# Patient Record
Sex: FEMALE | Race: WHITE | Employment: STUDENT | URBAN - METROPOLITAN AREA
[De-identification: names, ages, dates, MRNs, and addresses within clinical notes are randomized per-mention and may not be internally consistent; named-entity substitution may affect disease eponyms.]

---
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
## 2018-01-14 VITALS
HEIGHT: 65 IN | DIASTOLIC BLOOD PRESSURE: 60 MMHG | WEIGHT: 145 LBS | BODY MASS INDEX: 24.16 KG/M2 | SYSTOLIC BLOOD PRESSURE: 110 MMHG

## 2018-05-15 ENCOUNTER — OPTICAL OFFICE (OUTPATIENT)
Dept: URBAN - METROPOLITAN AREA CLINIC 146 | Facility: CLINIC | Age: 18
Setting detail: OPHTHALMOLOGY
End: 2018-05-15

## 2018-05-15 ENCOUNTER — DOCTOR'S OFFICE (OUTPATIENT)
Dept: URBAN - METROPOLITAN AREA CLINIC 137 | Facility: CLINIC | Age: 18
Setting detail: OPHTHALMOLOGY
End: 2018-05-15

## 2018-05-15 DIAGNOSIS — H52.13: ICD-10-CM

## 2018-05-15 DIAGNOSIS — H52.11: ICD-10-CM

## 2018-05-15 DIAGNOSIS — H52.12: ICD-10-CM

## 2018-05-15 PROCEDURE — SELFPAYVIS VISION VISIT SELF PAY: Performed by: OPHTHALMOLOGY

## 2018-05-15 PROCEDURE — S0500 DISPOS CONT LENS: HCPCS | Performed by: OPHTHALMOLOGY

## 2018-05-15 ASSESSMENT — CONFRONTATIONAL VISUAL FIELD TEST (CVF)
OD_FINDINGS: FULL
OS_FINDINGS: FULL

## 2018-05-15 ASSESSMENT — REFRACTION_CURRENTRX
OD_OVR_VA: 20/
OS_OVR_VA: 20/
OS_OVR_VA: 20/
OD_SPHERE: -2.00
OS_CYLINDER: +0.25
OD_OVR_VA: 20/
OD_CYLINDER: +0.25
OD_AXIS: 080
OD_OVR_VA: 20/
OS_AXIS: 096
OS_OVR_VA: 20/
OS_SPHERE: -2.00

## 2018-05-15 ASSESSMENT — REFRACTION_OUTSIDERX
OS_VA2: 20/20(J1+)
OD_SPHERE: -2.25
OD_SPHERE: -2.25
OS_AXIS: 090
OS_VA2: 20/
OS_AXIS: 90
OD_AXIS: 080
OD_VA1: 20/20
OD_CYLINDER: +0.25
OD_CYLINDER: +0.25
OU_VA: 20/
OS_VA1: 20/20
OD_VA2: 20/
OS_CYLINDER: +0.25
OS_SPHERE: -2.25
OS_SPHERE: -2.25
OD_VA3: 20/
OS_VA3: 20/
OD_AXIS: 80
OS_CYLINDER: +0.50
OD_VA3: 20/
OD_VA2: 20/20(J1+)
OU_VA: 20/
OS_VA3: 20/
OS_VA1: 20/20
OD_VA1: 20/20

## 2018-05-15 ASSESSMENT — VISUAL ACUITY
OD_BCVA: 20/20
OS_BCVA: 20/20

## 2018-05-15 ASSESSMENT — REFRACTION_AUTOREFRACTION
OS_CYLINDER: +0.75
OD_CYLINDER: +0.50
OS_AXIS: 091
OD_SPHERE: -2.25
OS_SPHERE: -2.25
OD_AXIS: 069

## 2018-05-15 ASSESSMENT — KERATOMETRY
OD_K2POWER_DIOPTERS: 46.75
OS_K1POWER_DIOPTERS: 45.75
OS_K2POWER_DIOPTERS: 46.50
OS_AXISANGLE_DEGREES: 092
OD_K1POWER_DIOPTERS: 46.00
OD_AXISANGLE_DEGREES: 082

## 2018-05-15 ASSESSMENT — AXIALLENGTH_DERIVED
OS_AL: 23.3605
OD_AL: 23.3183

## 2018-05-15 ASSESSMENT — REFRACTION_MANIFEST
OD_VA1: 20/
OS_VA1: 20/
OD_VA3: 20/
OU_VA: 20/
OS_VA3: 20/
OS_VA2: 20/
OD_VA2: 20/

## 2018-05-15 ASSESSMENT — SPHEQUIV_DERIVED
OD_SPHEQUIV: -2
OS_SPHEQUIV: -1.875

## 2018-09-24 ENCOUNTER — OPTICAL OFFICE (OUTPATIENT)
Dept: URBAN - METROPOLITAN AREA CLINIC 146 | Facility: CLINIC | Age: 18
Setting detail: OPHTHALMOLOGY
End: 2018-09-24

## 2018-09-24 DIAGNOSIS — H52.13: ICD-10-CM

## 2018-09-24 PROCEDURE — S0500 DISPOS CONT LENS: HCPCS | Performed by: OPHTHALMOLOGY

## 2019-03-01 ENCOUNTER — DOCTOR'S OFFICE (OUTPATIENT)
Dept: URBAN - METROPOLITAN AREA CLINIC 137 | Facility: CLINIC | Age: 19
Setting detail: OPHTHALMOLOGY
End: 2019-03-01

## 2019-03-01 DIAGNOSIS — H52.13: ICD-10-CM

## 2019-03-01 PROCEDURE — 92310 CONTACT LENS FITTING OU: CPT | Performed by: OPTOMETRIST

## 2019-03-01 PROCEDURE — SELFPAYVIS VISION VISIT SELF PAY: Performed by: OPTOMETRIST

## 2019-03-01 ASSESSMENT — REFRACTION_MANIFEST
OS_VA3: 20/
OD_VA1: 20/
OD_VA2: 20/
OD_VA3: 20/
OS_VA1: 20/
OS_VA2: 20/
OU_VA: 20/

## 2019-03-01 ASSESSMENT — CONFRONTATIONAL VISUAL FIELD TEST (CVF)
OD_FINDINGS: FULL
OS_FINDINGS: FULL

## 2019-12-26 ASSESSMENT — VISUAL ACUITY
OD_BCVA: 20/20-3
OS_BCVA: 20/20-2

## 2019-12-26 ASSESSMENT — REFRACTION_CURRENTRX
OS_OVR_VA: 20/
OD_OVR_VA: 20/
OS_OVR_VA: 20/
OS_AXIS: 096
OD_SPHERE: -2.00
OD_AXIS: 080
OD_CYLINDER: +0.25
OD_OVR_VA: 20/
OD_OVR_VA: 20/
OS_OVR_VA: 20/
OS_SPHERE: -2.00
OS_CYLINDER: +0.25

## 2019-12-26 ASSESSMENT — KERATOMETRY
OS_K1POWER_DIOPTERS: 45.75
OD_K1POWER_DIOPTERS: 46.00
OD_K2POWER_DIOPTERS: 46.75
OS_K2POWER_DIOPTERS: 46.50
OD_AXISANGLE_DEGREES: 082
OS_AXISANGLE_DEGREES: 092

## 2019-12-26 ASSESSMENT — REFRACTION_AUTOREFRACTION
OD_AXIS: 144
OS_SPHERE: -1.75
OD_SPHERE: -2.00
OS_CYLINDER: -1.00
OS_AXIS: 4
OD_CYLINDER: -0.25

## 2019-12-26 ASSESSMENT — REFRACTION_MANIFEST
OU_VA: 20/
OS_VA3: 20/
OS_CYLINDER: SPH
OD_VA2: 20/
OS_VA2: 20/
OS_SPHERE: -2.00
OD_VA3: 20/
OD_SPHERE: -2.00
OD_VA1: 20/20-
OS_VA1: 20/20
OD_CYLINDER: SPH

## 2019-12-26 ASSESSMENT — AXIALLENGTH_DERIVED
OD_AL: 23.366
OS_AL: 23.5047

## 2019-12-26 ASSESSMENT — SPHEQUIV_DERIVED
OS_SPHEQUIV: -2.25
OD_SPHEQUIV: -2.125

## 2020-01-07 ENCOUNTER — OFFICE VISIT (OUTPATIENT)
Dept: OBGYN CLINIC | Facility: CLINIC | Age: 20
End: 2020-01-07
Payer: COMMERCIAL

## 2020-01-07 VITALS
SYSTOLIC BLOOD PRESSURE: 118 MMHG | BODY MASS INDEX: 27.16 KG/M2 | WEIGHT: 169 LBS | DIASTOLIC BLOOD PRESSURE: 72 MMHG | HEIGHT: 66 IN

## 2020-01-07 DIAGNOSIS — B37.3 VAGINAL YEAST INFECTION: Primary | ICD-10-CM

## 2020-01-07 DIAGNOSIS — Z11.3 ROUTINE SCREENING FOR STI (SEXUALLY TRANSMITTED INFECTION): ICD-10-CM

## 2020-01-07 PROCEDURE — 87591 N.GONORRHOEAE DNA AMP PROB: CPT | Performed by: NURSE PRACTITIONER

## 2020-01-07 PROCEDURE — 87210 SMEAR WET MOUNT SALINE/INK: CPT | Performed by: NURSE PRACTITIONER

## 2020-01-07 PROCEDURE — 87491 CHLMYD TRACH DNA AMP PROBE: CPT | Performed by: NURSE PRACTITIONER

## 2020-01-07 PROCEDURE — 99213 OFFICE O/P EST LOW 20 MIN: CPT | Performed by: NURSE PRACTITIONER

## 2020-01-07 RX ORDER — FEXOFENADINE HYDROCHLORIDE 60 MG/1
60 TABLET, FILM COATED ORAL DAILY
COMMUNITY
End: 2021-07-13 | Stop reason: ALTCHOICE

## 2020-01-07 RX ORDER — FLUCONAZOLE 150 MG/1
150 TABLET ORAL ONCE
Qty: 1 TABLET | Refills: 0 | Status: SHIPPED | OUTPATIENT
Start: 2020-01-07 | End: 2020-01-07

## 2020-01-07 NOTE — PROGRESS NOTES
Assessment/Plan:      Diagnoses and all orders for this visit:    Vaginal yeast infection  -     fluconazole (DIFLUCAN) 150 mg tablet; Take 1 tablet (150 mg total) by mouth once for 1 dose    Routine screening for STI (sexually transmitted infection)  -     Chlamydia/GC amplified DNA by PCR    Other orders  -     fexofenadine (ALLEGRA) 60 MG tablet; Take 60 mg by mouth daily      -reviewed diagnosis of vaginal yeast infection and treatment with fluconazole  Written information provided  -reviewed safe sexual practices including consistent condom use  Patient verbalizes understanding of options for contraception  -urine dip negative in office today  -hygiene reviewed including avoid scented soaps lotions and lubricants, douching, tight/restrictive clothing  -will call with results    RTO for annual exam and if symptoms worsen or do not resolve    Subjective:     Patient ID: Jaimee Bui is a 22 y o  female  HPI G0 here with complaints vaginal itching, irritation and discharge for approximately 3 weeks  Was seen by PCP and treated with Flagyl  Patient states she has noticed no improvement or worsening of  symptoms since treatment  Associated symptoms include intermittent burning with urination and odor-different than normal   Has not tried any over-the-counter products  Denies alleviating or aggravating factors  Has not had similar episodes in the past   Is sexually active using condoms consistently  Denies new partner, bowel concerns, pelvic pain  Last Pap smear-has not had Pap smear  Last gonorrhea chlamydia-has not had gonorrhea/ chlamydia testing  Gardasil vaccine: Had vaccine series    Review of Systems   Constitutional: Negative for chills and fatigue  Genitourinary: Positive for vaginal discharge  Negative for decreased urine volume, difficulty urinating, dyspareunia, dysuria, flank pain, hematuria, menstrual problem, pelvic pain, urgency, vaginal bleeding and vaginal pain  Objective:     Physical Exam   Constitutional: She is oriented to person, place, and time  She appears well-developed and well-nourished  Cardiovascular: Normal rate, regular rhythm and normal heart sounds  Pulmonary/Chest: Effort normal and breath sounds normal    Genitourinary: There is no rash, tenderness, lesion or injury on the right labia  There is no rash, tenderness, lesion or injury on the left labia  Right adnexum displays no mass, no tenderness and no fullness  Left adnexum displays no mass, no tenderness and no fullness  No erythema, tenderness or bleeding in the vagina  No foreign body in the vagina  No signs of injury around the vagina  Vaginal discharge found  Genitourinary Comments: Large amount of vaginal discharge noted on exam-white and adherent to vaginal walls   Neurological: She is alert and oriented to person, place, and time  Psychiatric: She has a normal mood and affect   Her behavior is normal  Thought content normal

## 2020-01-07 NOTE — PATIENT INSTRUCTIONS
Fluconazole (By mouth)   Fluconazole (khju-[id number]-[id number])  Prevents and treats fungal infections  Brand Name(s): Diflucan   There may be other brand names for this medicine  When This Medicine Should Not Be Used: This medicine is not right for everyone  Do not use it if you had an allergic reaction to fluconazole, or if you are pregnant  How to Use This Medicine:   Liquid, Tablet  · Your doctor will tell you how much medicine to use  Do not use more than directed  · Oral liquid: Shake well just before each use  Measure the oral liquid medicine with a marked measuring spoon, oral syringe, or medicine cup  · Take all of the medicine in your prescription to clear up your infection, even if you feel better after the first few doses  · Read and follow the patient instructions that come with this medicine  Talk to your doctor or pharmacist if you have any questions  · Missed dose: Take a dose as soon as you remember  If it is almost time for your next dose, wait until then and take a regular dose  Do not take extra medicine to make up for a missed dose  · Store the medicine in a closed container at room temperature, away from heat, moisture, and direct light  Store the oral liquid in the refrigerator or at room temperature and use it within 14 days  Do not freeze  Drugs and Foods to Avoid:   Ask your doctor or pharmacist before using any other medicine, including over-the-counter medicines, vitamins, and herbal products  · Do not use this medicine together with astemizole, cisapride, erythromycin, pimozide, quinidine, or terfenadine  · Some foods and medicines can affect how fluconazole works  Tell your doctor if you are using cimetidine, midazolam, prednisone, rifabutin, rifampin, theophylline, tofacitinib, triazolam, vitamin A supplements, or voriconazole   Also tell your doctor if you are using any of the following:   ¨ A blood thinner (such as warfarin)  ¨ A diuretic or "water pill" (such as hydrochlorothiazide), or blood pressure medicine (such as amlodipine, felodipine, isradipine, losartan, nifedipine)  ¨ Birth control pills  ¨ Cancer medicine (cyclophosphamide, vinblastine, vincristine)  ¨ Diabetes medicine that you take by mouth (glipizide, glyburide, tolbutamide)  ¨ Medicine to lower cholesterol (atorvastatin, fluvastatin, simvastatin)  ¨ Medicine to treat depression (amitriptyline, nortriptyline)  ¨ Medicine to treat HIV/AIDS (saquinavir, zidovudine)  ¨ Medicine to treat malaria (halofantrine)  ¨ Medicine to treat seizures (carbamazepine, phenytoin)  ¨ Medicine that weakens the immune system (cyclosporine, sirolimus, tacrolimus)  ¨ Narcotic pain medicine (alfentanil, fentanyl, methadone)  ¨ Pain or arthritis medicine (aspirin, celecoxib, diclofenac, ibuprofen, naproxen)  Warnings While Using This Medicine:   · It is not safe to take this medicine during pregnancy  It could harm an unborn baby  Tell your doctor right away if you become pregnant  · Tell your doctor if you are breastfeeding, or if you have kidney disease, liver disease, heart disease, heart rhythm problems, cancer, or HIV/AIDS  · This medicine may cause the following problems:   ¨ Liver problems  ¨ Serious skin reactions  ¨ Changes in heart rhythm, such as a condition called QT prolongation  · This medicine may make you dizzy or drowsy  Do not drive or do anything that could be dangerous until you know how this medicine affects you  · Call your doctor if your symptoms do not improve or if they get worse  · Keep all medicine out of the reach of children  Never share your medicine with anyone    Possible Side Effects While Using This Medicine:   Call your doctor right away if you notice any of these side effects:  · Allergic reaction: Itching or hives, swelling in your face or hands, swelling or tingling in your mouth or throat, chest tightness, trouble breathing  · Blistering, peeling, or red skin rash  · Dark urine or pale stools, nausea, vomiting, loss of appetite, stomach pain, yellow skin or eyes  · Fast, pounding, or uneven heartbeat  · Unusual bleeding, bruising, or weakness  If you notice these less serious side effects, talk with your doctor:   · Headache  · Mild nausea, vomiting, stomach pain, or diarrhea  If you notice other side effects that you think are caused by this medicine, tell your doctor  Call your doctor for medical advice about side effects  You may report side effects to FDA at 8-038-KLM-9612  © 2017 [employer]  Information is for End User's use only and may not be sold, redistributed or otherwise used for commercial purposes  The above information is an  only  It is not intended as medical advice for individual conditions or treatments  Talk to your doctor, nurse or pharmacist before following any medical regimen to see if it is safe and effective for you  Vulvovaginal Candidiasis   WHAT YOU NEED TO KNOW:   Vulvovaginal candidiasis, or yeast infection, is a common vaginal infection  Vulvovaginal candidiasis is caused by a fungus, or yeast-like germ  Fungi are normally found in your vagina  When there are too many fungi, it can cause an infection  DISCHARGE INSTRUCTIONS:   Return to the emergency department if:   · You have fever and chills  · You are bleeding from your vagina and it is not your monthly period  · You develop abdominal or pelvic pain  Contact your healthcare provider if:   · Your signs and symptoms get worse, even after treatment  · You have questions or concerns about your condition or care  Medicines:   · Medicines  help treat the fungal infection and decrease inflammation  The medicine may be a pill, topical cream, or vaginal suppository  · Take your medicine as directed  Contact your healthcare provider if you think your medicine is not helping or if you have side effects  Tell him of her if you are allergic to any medicine   Keep a list of the medicines, vitamins, and herbs you take  Include the amounts, and when and why you take them  Bring the list or the pill bottles to follow-up visits  Carry your medicine list with you in case of an emergency  Manage your symptoms:   · Wear cotton underwear  · Keep the vaginal area clean and dry  · Do not have sex until your symptoms are gone  · Do not douche  · Do not use feminine hygiene sprays, powders, or bubble bath  Prevent another infection:   · Take showers instead of baths  · Eat yogurt  · Limit the amount of alcohol you drink'    · Control your blood sugar if you are diabetic  · Limit your time in hot tubs  Follow up with your healthcare provider as directed:  Write down your questions so you remember to ask them during your visits  © 2017 [employer] Information is for End User's use only and may not be sold, redistributed or otherwise used for commercial purposes  All illustrations and images included in CareNotes® are the copyrighted property of [employer]  or [employer]  The above information is an  only  It is not intended as medical advice for individual conditions or treatments  Talk to your doctor, nurse or pharmacist before following any medical regimen to see if it is safe and effective for you

## 2020-01-08 LAB
C TRACH DNA SPEC QL NAA+PROBE: NEGATIVE
N GONORRHOEA DNA SPEC QL NAA+PROBE: NEGATIVE

## 2021-01-07 ENCOUNTER — IMMUNIZATIONS (OUTPATIENT)
Dept: FAMILY MEDICINE CLINIC | Facility: HOSPITAL | Age: 21
End: 2021-01-07

## 2021-01-07 DIAGNOSIS — Z23 ENCOUNTER FOR IMMUNIZATION: ICD-10-CM

## 2021-01-07 PROCEDURE — 0011A SARS-COV-2 / COVID-19 MRNA VACCINE (MODERNA) 100 MCG: CPT | Performed by: EMERGENCY MEDICINE

## 2021-01-07 PROCEDURE — 91301 SARS-COV-2 / COVID-19 MRNA VACCINE (MODERNA) 100 MCG: CPT | Performed by: EMERGENCY MEDICINE

## 2021-02-04 ENCOUNTER — IMMUNIZATIONS (OUTPATIENT)
Dept: FAMILY MEDICINE CLINIC | Facility: HOSPITAL | Age: 21
End: 2021-02-04

## 2021-02-04 DIAGNOSIS — Z23 ENCOUNTER FOR IMMUNIZATION: Primary | ICD-10-CM

## 2021-02-04 PROCEDURE — 91301 SARS-COV-2 / COVID-19 MRNA VACCINE (MODERNA) 100 MCG: CPT | Performed by: ANESTHESIOLOGY

## 2021-02-04 PROCEDURE — 0012A SARS-COV-2 / COVID-19 MRNA VACCINE (MODERNA) 100 MCG: CPT | Performed by: ANESTHESIOLOGY

## 2021-07-13 ENCOUNTER — ANNUAL EXAM (OUTPATIENT)
Dept: OBGYN CLINIC | Facility: CLINIC | Age: 21
End: 2021-07-13
Payer: COMMERCIAL

## 2021-07-13 VITALS
WEIGHT: 156.2 LBS | SYSTOLIC BLOOD PRESSURE: 116 MMHG | BODY MASS INDEX: 25.1 KG/M2 | DIASTOLIC BLOOD PRESSURE: 70 MMHG | HEIGHT: 66 IN

## 2021-07-13 DIAGNOSIS — Z01.419 ENCOUNTER FOR GYNECOLOGICAL EXAMINATION (GENERAL) (ROUTINE) WITHOUT ABNORMAL FINDINGS: Primary | ICD-10-CM

## 2021-07-13 DIAGNOSIS — Z11.3 SCREENING FOR STD (SEXUALLY TRANSMITTED DISEASE): ICD-10-CM

## 2021-07-13 DIAGNOSIS — Z30.09 COUNSELING FOR BIRTH CONTROL REGARDING INTRAUTERINE DEVICE (IUD): ICD-10-CM

## 2021-07-13 PROCEDURE — G0145 SCR C/V CYTO,THINLAYER,RESCR: HCPCS | Performed by: PHYSICIAN ASSISTANT

## 2021-07-13 PROCEDURE — 87491 CHLMYD TRACH DNA AMP PROBE: CPT | Performed by: PHYSICIAN ASSISTANT

## 2021-07-13 PROCEDURE — 87591 N.GONORRHOEAE DNA AMP PROB: CPT | Performed by: PHYSICIAN ASSISTANT

## 2021-07-13 PROCEDURE — 99395 PREV VISIT EST AGE 18-39: CPT | Performed by: PHYSICIAN ASSISTANT

## 2021-07-13 RX ORDER — ESCITALOPRAM OXALATE 10 MG/1
TABLET ORAL
COMMUNITY
Start: 2021-04-09 | End: 2021-07-13 | Stop reason: ALTCHOICE

## 2021-07-13 RX ORDER — FEXOFENADINE HCL 180 MG/1
180 TABLET ORAL
COMMUNITY

## 2021-07-13 NOTE — PROGRESS NOTES
Assessment/Plan   Diagnoses and all orders for this visit:    Encounter for gynecological examination (general) (routine) without abnormal findings  -     Liquid-based pap, screening  The current ASCCP guidelines were reviewed  Patient's last pap was never and therefore, a pap is indicated at this time  I emphasized the importance of an annual pelvic and breast exam  Patient ok to have a pap done today  Screening for STD (sexually transmitted disease)  -     Hepatitis B surface antigen; Future  -     Hepatitis C antibody; Future  -     HIV 1/2 Antigen/Antibody (4th Generation) w Reflex [facility]; Future  -     RPR; Future  -     Chlamydia/GC amplified DNA by PCR  Encourage safe sexual practices; STD testing - C/G cultures collected and STI labs ordered    Counseling for birth control regarding intrauterine device (IUD)  Various contraceptive options were reviewed including, but not limited to, barrier methods (condoms, diaphragm), both combination (pill, patch, vaginal ring) and progesterone- only (pill, Depo provera, and Nexplanon), intrauterine devices (christiano, Kyleena, Mirena and Paragard)  The mechanisms, risks, benefits, and side effects of all methods were discussed  All questions have been answered to her satisfaction  Patient most interested in an IUD - reviewed Christiano vs Kyleena vs Mirena vs Paragard in great detail  The following risks were reviewed: if infected with a sexually transmitted infection such as chlamydia or gonorrhea the risk for pelvic inflammatory disease may be greater, as well as for resulting chronic pelvic pain or infertility  Emphasized need to continue with safe sex practices with condom use  Also reviewed possible dysfunction bleeding for a few months after insertion, the possibility of expulsion, the risk of ectopic pregnancy if patient were to conceive with the IUD in place  Patient expressed understanding of above and form regarding coverage, ordering, and scheduling provided  Directed patient to product information as well  Patient to call with any further questions/concerns  Discussion  I have discussed the importance of monthly self-breast exams, exercise and healthy diet as well as adequate intake of calcium and vitamin D  Encourage MVI q day and r/britney importance of folic acid; Encourage 30-40 min weight bearing exercise most days of week  Contraception - condoms and desires to discuss options  The patient has had the Gardasil vaccine series, which is recommended for patients from 545 years of age  All questions have been answered to her satisfaction  RTO for APE or sooner if needed    Subjective     HPI   Hero Kincaid is a 23 y o  female who presents for annual well woman exam    Menarche - 9; LMP - 6/5/21; Periods are reg q every 5 weeks - does get delayed with stress; periods last 6 days; No excessive bleeding; No intermenstrual bleeding or spotting; Cramps are tolerable  Increase hair growth - currently undergoing laser hair removal  Denies excessive acne  No vulvar itch/burn; No vaginal itch/burn; No abn discharge or odor; No urinary sx - burning/pain/frequency/hematuria  (+) SBEs - no breast masses, asymmetry, nipple discharge or bleeding, changes in skin of breast, or breast tenderness bilaterally  No abd/pelvic pain or HAs;   Pt is sexually active in a mutually monog sexual relationship x 5 months; No issues with intercourse; She requests std/hiv/hep testing; Feels safe at home  Current contraception: condoms  Gardasil - per patient received all 3 shots  (+) PCP for routine Bw/care; Last Pap - none  History of abnormal Pap smear:   Last STD screen - 1/7/20 - C/G neg    Review of Systems   Constitutional: Negative for activity change, fatigue, fever and unexpected weight change  HENT: Negative for congestion, dental problem, sinus pressure and sinus pain  Eyes: Negative for visual disturbance     Respiratory: Negative for cough, shortness of breath and wheezing  Cardiovascular: Negative for chest pain and leg swelling  Gastrointestinal: Negative for abdominal distention, abdominal pain, blood in stool, constipation, diarrhea, nausea and vomiting  Endocrine: Negative for polydipsia  Genitourinary: Negative for difficulty urinating, dyspareunia, dysuria, frequency, hematuria, menstrual problem, pelvic pain, urgency, vaginal bleeding, vaginal discharge and vaginal pain  Musculoskeletal: Negative for arthralgias and back pain  Allergic/Immunologic: Negative for environmental allergies  Neurological: Negative for dizziness, seizures and headaches  Psychiatric/Behavioral: Negative for dysphoric mood and sleep disturbance  The patient is not nervous/anxious          The following portions of the patient's history were reviewed and updated as appropriate: allergies, current medications, past family history, past medical history, past social history, past surgical history and problem list          OB History        0    Para   0    Term   0       0    AB   0    Living   0       SAB   0    TAB   0    Ectopic   0    Multiple   0    Live Births   0                 Past Medical History:   Diagnosis Date    Seasonal allergies        Past Surgical History:   Procedure Laterality Date    WISDOM TOOTH EXTRACTION  2016       Family History   Problem Relation Age of Onset    No Known Problems Mother     No Known Problems Father        Social History     Socioeconomic History    Marital status: Single     Spouse name: Not on file    Number of children: Not on file    Years of education: Not on file    Highest education level: Not on file   Occupational History    Not on file   Tobacco Use    Smoking status: Never Smoker    Smokeless tobacco: Never Used   Vaping Use    Vaping Use: Never used   Substance and Sexual Activity    Alcohol use: Yes     Comment: social     Drug use: Never    Sexual activity: Yes     Partners: Male     Birth control/protection: Condom Male   Other Topics Concern    Not on file   Social History Narrative    Not on file     Social Determinants of Health     Financial Resource Strain:     Difficulty of Paying Living Expenses:    Food Insecurity:     Worried About Running Out of Food in the Last Year:     [address] in the Last Year:    Transportation Needs:     Lack of Transportation (Medical):  Lack of Transportation (Non-Medical):    Physical Activity:     Days of Exercise per Week:     Minutes of Exercise per Session:    Stress:     Feeling of Stress :    Social Connections:     Frequency of Communication with Friends and Family:     Frequency of Social Gatherings with Friends and Family:     Attends [employer] Services:     Active Member of Clubs or Organizations:     Attends Club or Organization Meetings:     Marital Status:    Intimate Partner Violence:     Fear of Current or Ex-Partner:     Emotionally Abused:     Physically Abused:     Sexually Abused:          Current Outpatient Medications:     fexofenadine (ALLEGRA) 180 MG tablet, Take 180 mg by mouth, Disp: , Rfl:     MULTIPLE VITAMIN PO, Take by mouth, Disp: , Rfl:     Allergies   Allergen Reactions    Delsym [Dextromethorphan] Hives       Objective   Vitals:    07/13/21 1136   BP: 116/70   BP Location: Left arm   Patient Position: Sitting   Cuff Size: Standard   Weight: 70 9 kg (156 lb 3 2 oz)   Height: 5' 6" (1 676 m)     Physical Exam  Vitals reviewed  Constitutional:       General: She is awake  She is not in acute distress  Appearance: Normal appearance  She is well-developed and well-groomed  She is not diaphoretic  Eyes:      Conjunctiva/sclera: Conjunctivae normal    Neck:      Thyroid: No thyroid mass, thyromegaly or thyroid tenderness  Cardiovascular:      Rate and Rhythm: Normal rate and regular rhythm  Heart sounds: Normal heart sounds  No murmur heard       Pulmonary:      Effort: Pulmonary effort is normal  No tachypnea, bradypnea or respiratory distress  Breath sounds: Normal breath sounds  No stridor or decreased air movement  No wheezing  Chest:      Breasts: Breasts are symmetrical          Right: Normal  No swelling, bleeding, inverted nipple, mass, nipple discharge, skin change or tenderness  Left: Normal  No swelling, bleeding, inverted nipple, mass, nipple discharge, skin change or tenderness  Abdominal:      General: There is no distension  Palpations: Abdomen is soft  There is no hepatomegaly, splenomegaly or mass  Tenderness: There is no abdominal tenderness  Hernia: No hernia is present  There is no hernia in the left inguinal area or right inguinal area  Genitourinary:     General: Normal vulva  Exam position: Supine  Pubic Area: No rash or pubic lice  Labia:         Right: No rash, tenderness, lesion or injury  Left: No rash, tenderness, lesion or injury  Urethra: No prolapse, urethral pain, urethral swelling or urethral lesion  Vagina: Normal  No signs of injury and foreign body  No vaginal discharge, erythema, tenderness, bleeding, lesions or prolapsed vaginal walls  Cervix: No cervical motion tenderness, discharge, friability, lesion, erythema or cervical bleeding  Uterus: Not deviated, not enlarged, not fixed, not tender and no uterine prolapse  Adnexa:         Right: No mass, tenderness or fullness  Left: No mass, tenderness or fullness  Lymphadenopathy:      Cervical: No cervical adenopathy  Upper Body:      Right upper body: No supraclavicular or axillary adenopathy  Left upper body: No supraclavicular or axillary adenopathy  Lower Body: No right inguinal adenopathy  No left inguinal adenopathy  Skin:     General: Skin is warm and dry  Neurological:      Mental Status: She is alert and oriented to person, place, and time     Psychiatric:         Mood and Affect: Mood and affect normal          Speech: Speech normal          Behavior: Behavior normal  Behavior is cooperative  Thought Content:  Thought content normal          Judgment: Judgment normal

## 2021-07-14 LAB
C TRACH DNA SPEC QL NAA+PROBE: NEGATIVE
N GONORRHOEA DNA SPEC QL NAA+PROBE: NEGATIVE

## 2021-07-19 LAB
LAB AP GYN PRIMARY INTERPRETATION: NORMAL
LAB AP LMP: NORMAL
Lab: NORMAL

## 2021-08-16 ENCOUNTER — TELEPHONE (OUTPATIENT)
Dept: OBGYN CLINIC | Facility: CLINIC | Age: 21
End: 2021-08-16

## 2021-08-16 NOTE — TELEPHONE ENCOUNTER
Pt called and would like talk about birth control options and hormone testing for concerns with PCOS,  She had appt with Mary Lamb in July

## 2021-08-26 ENCOUNTER — OFFICE VISIT (OUTPATIENT)
Dept: OBGYN CLINIC | Facility: CLINIC | Age: 21
End: 2021-08-26
Payer: COMMERCIAL

## 2021-08-26 VITALS
HEIGHT: 66 IN | BODY MASS INDEX: 24.43 KG/M2 | DIASTOLIC BLOOD PRESSURE: 74 MMHG | SYSTOLIC BLOOD PRESSURE: 112 MMHG | WEIGHT: 152 LBS

## 2021-08-26 DIAGNOSIS — Z30.09 COUNSELING FOR INITIATION OF BIRTH CONTROL METHOD: ICD-10-CM

## 2021-08-26 DIAGNOSIS — N92.1 MENORRHAGIA WITH IRREGULAR CYCLE: Primary | ICD-10-CM

## 2021-08-26 PROCEDURE — 3008F BODY MASS INDEX DOCD: CPT | Performed by: PHYSICIAN ASSISTANT

## 2021-08-26 PROCEDURE — 99213 OFFICE O/P EST LOW 20 MIN: CPT | Performed by: PHYSICIAN ASSISTANT

## 2021-08-26 RX ORDER — SODIUM FLUORIDE 6 MG/ML
PASTE, DENTIFRICE DENTAL
COMMUNITY
Start: 2021-08-05

## 2021-08-26 RX ORDER — DROSPIRENONE AND ETHINYL ESTRADIOL 0.02-3(28)
1 KIT ORAL DAILY
Qty: 28 TABLET | Refills: 3 | Status: SHIPPED | OUTPATIENT
Start: 2021-08-26 | End: 2021-12-14 | Stop reason: SDUPTHER

## 2021-08-26 NOTE — ASSESSMENT & PLAN NOTE
Fasting labs and pelvic ultrasound given for work-up  Will plan trial of Danitza to help address her symptoms    RTO in 3 months for a pill check

## 2021-08-26 NOTE — PROGRESS NOTES
Assessment/Plan   Diagnoses and all orders for this visit:    Menorrhagia with irregular cycle  -     CBC and differential; Future  -     Comprehensive metabolic panel; Future  -     Follicle stimulating hormone; Future  -     Hemoglobin A1C; Future  -     Insulin, fasting; Future  -     Luteinizing hormone; Future  -     Pregnancy Test (HCG Qualitative); Future  -     Prolactin; Future  -     T4, free; Future  -     TSH, 3rd generation; Future  -     Testosterone, free, total; Future  -     US pelvis complete w transvaginal; Future    Counseling for initiation of birth control method    Discussion  Based on signs and symptoms, PCOS could be an explanation  Work-up provided including fasting BW and patient advised to have done prior to starting on OCP  Slip for a pelvic ultrasound given as well to assess heavy periods and pain during intercourse - best to get done the week after her period  Desires OCP - will try Danitza to hopefully best address all her symptoms:   1)  Begin the pill on the Sunday of the week of your next period, starting with the first pill in the packet (If your period starts on a Sunday - start the pill that very same day; if your period starts any other day of the week - wait until the Sunday of that week to start with the first pill)  Take it the same time daily, within the same hour time frame (such as between 8 and 9am)  2) It common to experience some irregular bleeding when newly starting the pill  This should resolve after 3 months of use  Also minor side effects such as breast tenderness, minor headaches and nausea may occur, but typically resolve after continuing on the pill for at least 3 months  3)  Call if you experience severe headaches, visual disturbances, chest pain, shortness of breath, abdominal pain, pain tingling or weakness in arms or legs  4) Advise a back-up method, like condoms, during the first month on the OCP, if misses any pills, or is put on antibiotics  Reviewed missed pill protocol;   5) Advise safe sexual practices and the importance of condoms to prevent the transmission of STDs  6) Return visit in 3 months for pill & blood pressure check  Can consider re-trial of laser hair removal after at least 6 months on Danitza    Subjective     HPI   Katina Eisenmenger is a 23 y o  female who presents to discuss possible PCOS  Menarche - 4; LMP - 7/30/21; Periods are irreg - come for the most part every 5 weeks - can get delayed due to stress so can go as long as 7 weeks; last 6 days; Heavy flow days 2-4 - at the heaviest changes a super tampon q 2 hours; No intermenstrual bleeding or spotting; Cramps can be intense but are usually tolerable with Aleve  At her annual on 7/13/21 - did report increased hair growth - got laser hair removal and denied excessive acne - feels like the laser hair removal did not work so was told could be hormonal  Also feels like her emotions fluctuate a lot prior to and during period  We discussed IUD at that time - counseled on all different types and info given - she is no longer interested and desires to start on combination OCP in hopes will also help to decrease her hair growth  No abd/pelvic pain or HAs; Reports painful IC with deep penetration for the past couple months - not every time  Pt is sexually active in a mutually monog sexual relationship - same partner as last visit; She declines std/hiv/hep testing; Feels safe at home  Current contraception: condoms    Last Pap - 7/13/21 - WNL  History of abnormal Pap smear: no  Last STI screen - 7/13/21- C/G neg and STI labs not done    Review of Systems   Constitutional: Negative for activity change, fatigue, fever and unexpected weight change  HENT: Negative for congestion, dental problem, sinus pressure and sinus pain  Eyes: Negative for visual disturbance  Respiratory: Negative for cough, shortness of breath and wheezing  Cardiovascular: Negative for chest pain and leg swelling  Gastrointestinal: Negative for abdominal distention, abdominal pain, blood in stool, constipation, diarrhea, nausea and vomiting  Endocrine: Negative for polydipsia  Genitourinary: Positive for dyspareunia (as noted in HPI) and menstrual problem (as noted in HPI)  Negative for difficulty urinating, dysuria, frequency, hematuria, pelvic pain, urgency, vaginal bleeding, vaginal discharge and vaginal pain  Musculoskeletal: Negative for arthralgias and back pain  Allergic/Immunologic: Negative for environmental allergies  Neurological: Negative for dizziness, seizures and headaches  Psychiatric/Behavioral: Negative for dysphoric mood and sleep disturbance  The patient is not nervous/anxious  The following portions of the patient's history were reviewed and updated as appropriate: allergies, current medications, past family history, past medical history, past social history, past surgical history and problem list          Past Medical History:   Diagnosis Date    Seasonal allergies        Past Surgical History:   Procedure Laterality Date    WISDOM TOOTH EXTRACTION  11/2016       Family History   Problem Relation Age of Onset    No Known Problems Mother     No Known Problems Father        Social History     Socioeconomic History    Marital status: Single     Spouse name: Not on file    Number of children: Not on file    Years of education: Not on file    Highest education level: Not on file   Occupational History    Not on file   Tobacco Use    Smoking status: Never Smoker    Smokeless tobacco: Never Used   Vaping Use    Vaping Use: Never used   Substance and Sexual Activity    Alcohol use:  Yes     Alcohol/week: 6 0 standard drinks     Types: 6 Standard drinks or equivalent per week     Comment: social     Drug use: Never    Sexual activity: Yes     Partners: Male     Birth control/protection: Condom Male   Other Topics Concern    Not on file   Social History Narrative    Not on file Social Determinants of Health     Financial Resource Strain:     Difficulty of Paying Living Expenses:    Food Insecurity:     Worried About Running Out of Food in the Last Year:     [address] in the Last Year:    Transportation Needs:     Lack of Transportation (Medical):  Lack of Transportation (Non-Medical):    Physical Activity:     Days of Exercise per Week:     Minutes of Exercise per Session:    Stress:     Feeling of Stress :    Social Connections:     Frequency of Communication with Friends and Family:     Frequency of Social Gatherings with Friends and Family:     Attends Yazidism Services:     Active Member of Clubs or Organizations:     Attends Club or Organization Meetings:     Marital Status:    Intimate Partner Violence:     Fear of Current or Ex-Partner:     Emotionally Abused:     Physically Abused:     Sexually Abused:          Current Outpatient Medications:     fexofenadine (ALLEGRA) 180 MG tablet, Take 180 mg by mouth, Disp: , Rfl:     MULTIPLE VITAMIN PO, Take by mouth, Disp: , Rfl:     PreviDent 5000 Booster Plus 1 1 % PSTE, , Disp: , Rfl:     Allergies   Allergen Reactions    Delsym [Dextromethorphan] Hives       Objective   Vitals:    08/26/21 1120   BP: 112/74   BP Location: Left arm   Patient Position: Sitting   Cuff Size: Standard   Weight: 68 9 kg (152 lb)   Height: 5' 6" (1 676 m)     Physical Exam  Vitals reviewed  Constitutional:       General: She is not in acute distress  Appearance: Normal appearance  She is normal weight  She is not ill-appearing, toxic-appearing or diaphoretic  Neurological:      Mental Status: She is alert and oriented to person, place, and time  Psychiatric:         Mood and Affect: Mood normal          Behavior: Behavior normal          Thought Content:  Thought content normal          Judgment: Judgment normal

## 2021-08-31 LAB
ALBUMIN SERPL-MCNC: 4.4 G/DL (ref 3.6–5.1)
ALBUMIN/GLOB SERPL: 1.8 (CALC) (ref 1–2.5)
ALP SERPL-CCNC: 70 U/L (ref 31–125)
ALT SERPL-CCNC: 12 U/L (ref 6–29)
AST SERPL-CCNC: 15 U/L (ref 10–30)
B-HCG SERPL QL: NEGATIVE
BASOPHILS # BLD AUTO: 28 CELLS/UL (ref 0–200)
BASOPHILS NFR BLD AUTO: 0.4 %
BILIRUB SERPL-MCNC: 0.5 MG/DL (ref 0.2–1.2)
BUN SERPL-MCNC: 16 MG/DL (ref 7–25)
BUN/CREAT SERPL: NORMAL (CALC) (ref 6–22)
CALCIUM SERPL-MCNC: 9.4 MG/DL (ref 8.6–10.2)
CHLORIDE SERPL-SCNC: 105 MMOL/L (ref 98–110)
CO2 SERPL-SCNC: 26 MMOL/L (ref 20–32)
CREAT SERPL-MCNC: 0.67 MG/DL (ref 0.5–1.1)
EOSINOPHIL # BLD AUTO: 168 CELLS/UL (ref 15–500)
EOSINOPHIL NFR BLD AUTO: 2.4 %
ERYTHROCYTE [DISTWIDTH] IN BLOOD BY AUTOMATED COUNT: 12.3 % (ref 11–15)
FSH SERPL-ACNC: 3.8 MIU/ML
GLOBULIN SER CALC-MCNC: 2.4 G/DL (CALC) (ref 1.9–3.7)
GLUCOSE SERPL-MCNC: 82 MG/DL (ref 65–99)
HBA1C MFR BLD: 4.8 % OF TOTAL HGB
HCT VFR BLD AUTO: 37.6 % (ref 35–45)
HGB BLD-MCNC: 12.6 G/DL (ref 11.7–15.5)
INSULIN SERPL-ACNC: 4.9 UIU/ML
LH SERPL-ACNC: 10 MIU/ML
LYMPHOCYTES # BLD AUTO: 2051 CELLS/UL (ref 850–3900)
LYMPHOCYTES NFR BLD AUTO: 29.3 %
MCH RBC QN AUTO: 30 PG (ref 27–33)
MCHC RBC AUTO-ENTMCNC: 33.5 G/DL (ref 32–36)
MCV RBC AUTO: 89.5 FL (ref 80–100)
MONOCYTES # BLD AUTO: 588 CELLS/UL (ref 200–950)
MONOCYTES NFR BLD AUTO: 8.4 %
NEUTROPHILS # BLD AUTO: 4165 CELLS/UL (ref 1500–7800)
NEUTROPHILS NFR BLD AUTO: 59.5 %
PLATELET # BLD AUTO: 231 THOUSAND/UL (ref 140–400)
PMV BLD REES-ECKER: 10.9 FL (ref 7.5–12.5)
POTASSIUM SERPL-SCNC: 4.2 MMOL/L (ref 3.5–5.3)
PROLACTIN SERPL-MCNC: 23.1 NG/ML
PROT SERPL-MCNC: 6.8 G/DL (ref 6.1–8.1)
RBC # BLD AUTO: 4.2 MILLION/UL (ref 3.8–5.1)
SL AMB EGFR AFRICAN AMERICAN: 145 ML/MIN/1.73M2
SL AMB EGFR NON AFRICAN AMERICAN: 125 ML/MIN/1.73M2
SODIUM SERPL-SCNC: 138 MMOL/L (ref 135–146)
T4 FREE SERPL-MCNC: 1 NG/DL (ref 0.8–1.8)
TESTOST FREE SERPL-MCNC: 6.1 PG/ML (ref 0.1–6.4)
TESTOST SERPL-MCNC: 34 NG/DL (ref 2–45)
TSH SERPL-ACNC: 1.62 MIU/L
WBC # BLD AUTO: 7 THOUSAND/UL (ref 3.8–10.8)

## 2021-09-03 ENCOUNTER — TELEPHONE (OUTPATIENT)
Dept: OBGYN CLINIC | Facility: CLINIC | Age: 21
End: 2021-09-03

## 2021-11-01 ENCOUNTER — TELEPHONE (OUTPATIENT)
Dept: OBGYN CLINIC | Facility: CLINIC | Age: 21
End: 2021-11-01

## 2021-12-14 DIAGNOSIS — N92.1 MENORRHAGIA WITH IRREGULAR CYCLE: ICD-10-CM

## 2021-12-14 DIAGNOSIS — Z30.09 COUNSELING FOR INITIATION OF BIRTH CONTROL METHOD: ICD-10-CM

## 2021-12-14 RX ORDER — DROSPIRENONE AND ETHINYL ESTRADIOL 0.02-3(28)
1 KIT ORAL DAILY
Qty: 84 TABLET | Refills: 2 | Status: SHIPPED | OUTPATIENT
Start: 2021-12-14

## 2022-09-01 DIAGNOSIS — Z30.09 COUNSELING FOR INITIATION OF BIRTH CONTROL METHOD: ICD-10-CM

## 2022-09-01 DIAGNOSIS — N92.1 MENORRHAGIA WITH IRREGULAR CYCLE: ICD-10-CM

## 2022-09-01 RX ORDER — DROSPIRENONE AND ETHINYL ESTRADIOL 0.02-3(28)
1 KIT ORAL DAILY
Qty: 84 TABLET | Refills: 0 | Status: SHIPPED | OUTPATIENT
Start: 2022-09-01

## 2022-09-01 NOTE — TELEPHONE ENCOUNTER
Pt missed her yearly appt yesterday( thought it was today) she only has one month of BC left and I couldn't get her in for her yearly until the end of November  Could we please give her enough to hold her over? Thank you!

## 2022-11-28 ENCOUNTER — ANNUAL EXAM (OUTPATIENT)
Dept: OBGYN CLINIC | Facility: CLINIC | Age: 22
End: 2022-11-28

## 2022-11-28 VITALS
SYSTOLIC BLOOD PRESSURE: 110 MMHG | DIASTOLIC BLOOD PRESSURE: 74 MMHG | WEIGHT: 158 LBS | BODY MASS INDEX: 25.39 KG/M2 | HEIGHT: 66 IN

## 2022-11-28 DIAGNOSIS — Z01.419 ENCOUNTER FOR WELL WOMAN EXAM WITH ROUTINE GYNECOLOGICAL EXAM: Primary | ICD-10-CM

## 2022-11-28 DIAGNOSIS — Z30.09 COUNSELING FOR INITIATION OF BIRTH CONTROL METHOD: ICD-10-CM

## 2022-11-28 DIAGNOSIS — N92.1 MENORRHAGIA WITH IRREGULAR CYCLE: ICD-10-CM

## 2022-11-28 DIAGNOSIS — B37.31 VAGINAL CANDIDIASIS: ICD-10-CM

## 2022-11-28 DIAGNOSIS — Z11.3 SCREENING EXAMINATION FOR STD (SEXUALLY TRANSMITTED DISEASE): ICD-10-CM

## 2022-11-28 RX ORDER — DROSPIRENONE AND ETHINYL ESTRADIOL 0.02-3(28)
1 KIT ORAL DAILY
Qty: 84 TABLET | Refills: 3 | Status: SHIPPED | OUTPATIENT
Start: 2022-11-28

## 2022-11-28 NOTE — PROGRESS NOTES
[facility]   Annual Well Woman Exam  Venu Lorenzo MD  22      ASSESSMENT & PLAN: Kenneth Schilder is a 19 y o  Yudith Faisal with normal gynecologic exam     1   Routine well woman exam done today  2    Pap and HPV: Pap with reflex HPV was not done today as patient is up to date  Current ASCCP Guidelines reviewed  3   The patient accepted STD testing  chlamydia and gonorrhea testing performed  She declines blood testing  Safe sex practices have been discussed  4   Gardasil is recommended for patients from 89 years of age  The patient has had the Gardasil vaccine series per patient report  5  The patient is sexually active  She would like to continue on Danitza- refilled and reviewed  6  Vaginal candidiasis on exam- monistat 7 sent to pharmacy  7  The following were reviewed in today's visit: breast self exam, STD testing, family planning choices, exercise and healthy diet  8  Patient to return to office in 12 months for annual exam or sooner if needed  All questions have been answered to her satisfaction  Subjective:    CC:  Annual Gynecologic Examination    HPI: Kenneth Schilder is a 19 y o  Yudith Faisal who presents for annual gynecologic examination  She has the following concerns: Increased discharge with menses  GYN  Complaints: denies  Denies change in menstrual cycle, dysmenorrhea, dyspareunia, genital discharge, genital ulcers, irregular/heavy menses, pelvic pain and vulvar/vaginal symptoms  Menstrual cycles are regular, occurring every 28 days and lasting 5 days  Improved dysmenorrhea  Sexually active: Yes - single partner - male  Birth control: combination OCPs    Hx STI: denies   Last Pap 2021 :  no abnormalities    OB     Pregnancy complications: NA    G/U  Complaints: denies  Denies urinary frequency, hematuria, urinary hesitancy, urinary retention and urinary incontinence    Breast  Complaints: denies  Denies: breast lump, breast tenderness, changed mole, dryness, nipple discharge, pruritus, rash, skin color change and skin lesion(s)  Family hx: denies fhx of uterine, ovarian, or colon cancers  Maternal great aunts aunts- no one else had cancerous  She does practice breast self awareness  Health Maintenance:    She does follow a well balanced diet  She exercises 0 days per week  She wears her seatbelt routinely  She feels safe at home and domestic violence  She does not use tobacco  She does follow with a PCP  Past Medical History:   Diagnosis Date   • Seasonal allergies        Past Surgical History:   Procedure Laterality Date   • WISDOM TOOTH EXTRACTION  11/2016       Past OB/Gyn History:     Patient's last menstrual period was 11/14/2022 (approximate)  Family History:  Family History   Problem Relation Age of Onset   • No Known Problems Mother    • No Known Problems Father        Social History:  Social History     Socioeconomic History   • Marital status: Single     Spouse name: Not on file   • Number of children: Not on file   • Years of education: Not on file   • Highest education level: Not on file   Occupational History   • Not on file   Tobacco Use   • Smoking status: Never   • Smokeless tobacco: Never   Vaping Use   • Vaping Use: Never used   Substance and Sexual Activity   • Alcohol use:  Yes     Alcohol/week: 6 0 standard drinks     Types: 6 Standard drinks or equivalent per week     Comment: social    • Drug use: Never   • Sexual activity: Yes     Partners: Male     Birth control/protection: Coitus interruptus, Condom Male, OCP     Comment: I didnt see an option for oral birth control but im on sharon   Other Topics Concern   • Not on file   Social History Narrative   • Not on file     Social Determinants of Health     Financial Resource Strain: Not on file   Food Insecurity: Not on file   Transportation Needs: Not on file   Physical Activity: Not on file   Stress: Not on file   Social Connections: Not on file   Intimate Partner Violence: Not on file   Housing Stability: Not on file     Allergies   Allergen Reactions   • Delsym [Dextromethorphan] Hives       Current Outpatient Medications:   •  drospirenone-ethinyl estradiol (ALYSSA) 3-0 02 MG per tablet, Take 1 tablet by mouth daily, Disp: 84 tablet, Rfl: 0  •  fexofenadine (ALLEGRA) 180 MG tablet, Take 180 mg by mouth, Disp: , Rfl:   •  MULTIPLE VITAMIN PO, Take by mouth, Disp: , Rfl:   •  PreviDent 5000 Booster Plus 1 1 % PSTE, , Disp: , Rfl:     Review of Systems:  Denies fevers, chills, unintentional weight loss, excessive fatigue, chest pain, shortness of breath, abdominal pain, nausea, vomiting, urinary incontinence, urinary frequency, vaginal bleeding, vaginal discharge  All other systems negative unless otherwise stated  Physical Exam:  /74 (BP Location: Left arm, Patient Position: Sitting, Cuff Size: Standard)   Ht 5' 6" (1 676 m)   Wt 71 7 kg (158 lb)   LMP 11/14/2022 (Approximate)   BMI 25 50 kg/m²  Body mass index is 25 5 kg/m²  GEN: The patient was alert and oriented x3, pleasant well-appearing female in no acute distress  HEENT:  Unremarkable, no anterior or posterior lymphadenopathy, no thyromegaly  CV:  Regular rate and rhythm, normal S1 and S2, no murmurs  RESP:  Clear to auscultation bilaterally, no wheezes, rales or rhonchi  BREAST:  Symmetric breasts with no palpable breast masses or obvious breast lesions  She has no retractions or nipple discharge  She has no axillary abnormalities or palpable masses  GI:  Soft, nontender, non-distended  MSK: bilateral lower extremities are nontender, no edema  : Normal appearing external female genitalia, normal appearing urethral meatus  On sterile speculum exam, normal appearing vaginal epithelium, thick white curd-like vaginal discharge consistent with yeast, no bleeding, grossly normal appearing cervix  On bimanual exam,  no cervical motion tenderness; uterus is smooth, mobile, nontender   No tenderness or fullness in the bilateral adnexa

## 2022-11-29 LAB
C TRACH DNA SPEC QL NAA+PROBE: NEGATIVE
N GONORRHOEA DNA SPEC QL NAA+PROBE: NEGATIVE

## 2023-08-21 DIAGNOSIS — N92.1 MENORRHAGIA WITH IRREGULAR CYCLE: ICD-10-CM

## 2023-08-21 DIAGNOSIS — Z30.09 COUNSELING FOR INITIATION OF BIRTH CONTROL METHOD: ICD-10-CM

## 2023-08-22 RX ORDER — DROSPIRENONE AND ETHINYL ESTRADIOL 0.02-3(28)
1 KIT ORAL DAILY
Qty: 84 TABLET | Refills: 1 | Status: SHIPPED | OUTPATIENT
Start: 2023-08-22

## 2023-11-27 NOTE — PROGRESS NOTES
Assessment/Plan   Problem List Items Addressed This Visit       Menorrhagia with irregular cycle    Relevant Medications    drospirenone-ethinyl estradiol (Larisa) 3-0.02 MG per tablet     Other Visit Diagnoses       Well woman exam    -  Primary    Counseling for initiation of birth control method        Relevant Medications    drospirenone-ethinyl estradiol (Larisa) 3-0.02 MG per tablet            Discussion  I have discussed the importance of monthly self-breast exams, exercise and healthy diet. Encourage safe sexual practices; STI testing - declines  Contraception - NEHAL    The current ASCCP guidelines were reviewed. Patient's last pap was 2021 and therefore, a pap with HPV cotesting is not indicated at this time. Reviewed SOB upon waking can have multiple etiologies but not limited to anxiety/panic disorder, REGIS, and ENT disorders. Discussed patient is young, healthy, with normal BMI; most likely anxiety-related. However, encouraged her to reach out to PCP for evaluation. Provided information for talk-therapy if she desires. At this time, she declines. All questions have been answered to her satisfaction  RTO for APE or sooner if needed    Subjective     HPI   Mary Malone is a 23 y.o. female who presents for annual well woman exam.     Menarche: 13 y.o ; LMP - 11/13/23; Periods are reg q 28 days and last  5 days; Moderate flow with changing tampon every 8 hours. PMS symptoms and cramps tolerable. No vulvar itch/burn; No vaginal itch/burn; No abn discharge. +odor. No urinary sx - burning/pain/frequency/hematuria    (+) SBEs - no breast masses, asymmetry, nipple discharge or bleeding, changes in skin of breast, or breast tenderness bilaterally. No abd/pelvic pain or HAs;     Patient reports SOB upon waking x a few months. She reports that she has history of anxiety and depression. She previously utilized talk-therapy. Pt is sexually active in a mutually monog sexual relationship;  No issues with intercourse; She declines sti/hiv/hep testing; Feels safe at home    Current contraception: NEHAL    (+) PCP for routine Bw/care;        Review of Systems   Constitutional:  Negative for fatigue. Eyes:  Negative for photophobia and visual disturbance. Respiratory:  Negative for cough and shortness of breath. Cardiovascular:  Negative for chest pain and palpitations. Gastrointestinal:  Negative for abdominal pain, blood in stool, constipation, diarrhea, nausea and rectal pain. Genitourinary:  Negative for dyspareunia, dysuria, flank pain, frequency, genital sores, menstrual problem, pelvic pain, urgency, vaginal bleeding, vaginal discharge and vaginal pain. Musculoskeletal:  Negative for arthralgias and back pain. Skin:  Negative for rash. Neurological:  Negative for weakness and headaches. The following portions of the patient's history were reviewed and updated as appropriate: allergies, current medications, past family history, past medical history, past social history, past surgical history, and problem list.         OB History          0    Para   0    Term   0       0    AB   0    Living   0         SAB   0    IAB   0    Ectopic   0    Multiple   0    Live Births   0                 Past Medical History:   Diagnosis Date    Seasonal allergies        Past Surgical History:   Procedure Laterality Date    WISDOM TOOTH EXTRACTION  2016       Family History   Problem Relation Age of Onset    No Known Problems Mother     No Known Problems Father        Social History     Socioeconomic History    Marital status: Single     Spouse name: Not on file    Number of children: Not on file    Years of education: Not on file    Highest education level: Not on file   Occupational History    Not on file   Tobacco Use    Smoking status: Never    Smokeless tobacco: Never   Vaping Use    Vaping Use: Never used   Substance and Sexual Activity    Alcohol use:  Yes     Alcohol/week: 3.0 standard drinks of alcohol     Types: 3 Standard drinks or equivalent per week     Comment: social     Drug use: Never    Sexual activity: Yes     Partners: Male     Birth control/protection: Coitus interruptus, OCP     Comment: I didnt see an option for oral birth control but im on sharon   Other Topics Concern    Not on file   Social History Narrative    Not on file     Social Determinants of Health     Financial Resource Strain: Not on file   Food Insecurity: Not on file   Transportation Needs: Not on file   Physical Activity: Not on file   Stress: Not on file   Social Connections: Not on file   Intimate Partner Violence: Not on file   Housing Stability: Not on file         Current Outpatient Medications:     drospirenone-ethinyl estradiol (Larisa) 3-0.02 MG per tablet, Take 1 tablet by mouth daily, Disp: 84 tablet, Rfl: 3    fexofenadine (ALLEGRA) 180 MG tablet, Take 180 mg by mouth, Disp: , Rfl:     MULTIPLE VITAMIN PO, Take by mouth, Disp: , Rfl:     miconazole (MONISTAT-7) 2 % vaginal cream, Insert 1 applicator into the vagina daily at bedtime, Disp: 45 g, Rfl: 0    PreviDent 5000 Booster Plus 1.1 % PSTE, , Disp: , Rfl:     Allergies   Allergen Reactions    Delsym [Dextromethorphan] Hives       Objective   Vitals:    11/30/23 0858   BP: 116/72   BP Location: Left arm   Patient Position: Sitting   Cuff Size: Standard   Weight: 73 kg (161 lb)   Height: 5' 6" (1.676 m)     Physical Exam  Vitals and nursing note reviewed. Constitutional:       Appearance: Normal appearance. She is well-developed and normal weight. HENT:      Head: Normocephalic and atraumatic. Eyes:      Conjunctiva/sclera: Conjunctivae normal.   Cardiovascular:      Rate and Rhythm: Normal rate and regular rhythm. Heart sounds: Normal heart sounds. Pulmonary:      Effort: Pulmonary effort is normal.      Breath sounds: Normal breath sounds.    Chest:   Breasts:     Breasts are symmetrical.      Right: Normal. No inverted nipple, mass, nipple discharge, skin change or tenderness. Left: Normal. No inverted nipple, mass, nipple discharge, skin change or tenderness. Abdominal:      General: Abdomen is flat. There is no distension. Palpations: Abdomen is soft. There is no mass. Tenderness: There is no abdominal tenderness. There is no right CVA tenderness or left CVA tenderness. Genitourinary:     General: Normal vulva. Exam position: Lithotomy position. Pubic Area: No rash or pubic lice. Labia:         Right: No rash or tenderness. Left: No rash or tenderness. Urethra: No urethral pain. Vagina: Normal. No vaginal discharge. Cervix: Normal.      Uterus: Normal. No uterine prolapse. Adnexa: Right adnexa normal and left adnexa normal.        Right: No mass or tenderness. Left: No mass or tenderness. Musculoskeletal:         General: No tenderness. Normal range of motion. Cervical back: Normal range of motion. No tenderness. Right lower leg: No edema. Left lower leg: No edema. Lymphadenopathy:      Cervical: No cervical adenopathy. Upper Body:      Right upper body: No supraclavicular or axillary adenopathy. Left upper body: No supraclavicular or axillary adenopathy. Lower Body: No right inguinal adenopathy. No left inguinal adenopathy. Skin:     General: Skin is warm and dry. Neurological:      Mental Status: She is alert and oriented to person, place, and time. Motor: No weakness. Psychiatric:         Mood and Affect: Mood normal.         Behavior: Behavior normal.         Thought Content: Thought content normal.         Judgment: Judgment normal.         Patient Instructions   [email or web address]

## 2023-11-30 ENCOUNTER — ANNUAL EXAM (OUTPATIENT)
Dept: OBGYN CLINIC | Facility: CLINIC | Age: 23
End: 2023-11-30
Payer: COMMERCIAL

## 2023-11-30 VITALS
WEIGHT: 161 LBS | BODY MASS INDEX: 25.88 KG/M2 | HEIGHT: 66 IN | DIASTOLIC BLOOD PRESSURE: 72 MMHG | SYSTOLIC BLOOD PRESSURE: 116 MMHG

## 2023-11-30 DIAGNOSIS — Z01.419 WELL WOMAN EXAM: Primary | ICD-10-CM

## 2023-11-30 DIAGNOSIS — N92.1 MENORRHAGIA WITH IRREGULAR CYCLE: ICD-10-CM

## 2023-11-30 DIAGNOSIS — Z30.09 COUNSELING FOR INITIATION OF BIRTH CONTROL METHOD: ICD-10-CM

## 2023-11-30 PROCEDURE — S0612 ANNUAL GYNECOLOGICAL EXAMINA: HCPCS

## 2023-11-30 RX ORDER — DROSPIRENONE AND ETHINYL ESTRADIOL 0.02-3(28)
1 KIT ORAL DAILY
Qty: 84 TABLET | Refills: 3 | Status: SHIPPED | OUTPATIENT
Start: 2023-11-30

## 2024-02-21 PROBLEM — Z01.419 ENCOUNTER FOR GYNECOLOGICAL EXAMINATION (GENERAL) (ROUTINE) WITHOUT ABNORMAL FINDINGS: Status: RESOLVED | Noted: 2021-07-13 | Resolved: 2024-02-21

## 2024-07-02 DIAGNOSIS — Z30.09 COUNSELING FOR INITIATION OF BIRTH CONTROL METHOD: ICD-10-CM

## 2024-07-02 DIAGNOSIS — N92.1 MENORRHAGIA WITH IRREGULAR CYCLE: ICD-10-CM

## 2024-07-02 RX ORDER — DROSPIRENONE AND ETHINYL ESTRADIOL 0.02-3(28)
1 KIT ORAL DAILY
Qty: 84 TABLET | Refills: 0 | OUTPATIENT
Start: 2024-07-02

## 2024-07-02 NOTE — TELEPHONE ENCOUNTER
Patient needs an appointment. Please contact the patient to schedule an appointment.  Pt just  3 month supply today and last Annual was 11/30. Please schedule appt for next Annual.   no exacerbation  on home med Symbicort  c/w home med